# Patient Record
(demographics unavailable — no encounter records)

---

## 2025-05-07 NOTE — PHYSICAL EXAM
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

## 2025-05-07 NOTE — PLAN
[TextEntry] : Reviewed prior imaging findings and pathology and discussed results with patient. Reviewed benign nature of fibroadenoma and that they do not increase your risk of breast cancer however do increase your risk of growing more fibroadenomas.  Slide review will be performed to confirm the diagnosis, TACHO path.  She is to have 6-month follow-up B/L DX US to ensure stability of biopsied fibroadenoma and R breast mass in 9/2025.  If stable, she is to have a bilateral ultrasound April 2026 to confirm stability for 1 year follow-up.  She is to be reexamined by me same day.

## 2025-05-07 NOTE — FAMILY HISTORY
[TextEntry] : Denies fh of breast cancer, ovarian cancer, pancreatic cancer, prostate cancer, or melanoma. Detail Level: Detailed

## 2025-05-07 NOTE — HISTORY OF PRESENT ILLNESS
[FreeTextEntry1] : Patient is a 30 yo F who presents today for initial evaluation for breast cancer screening.  Patient originally felt a left breast mass on physical examination and was seen by her PCP and sent for imaging.  She was sent for B/ldxMG/US 3/31/25 showing a 0.9cm 12:00 mass on MG corresponding to 1.2cm 11:00 5FN mass on US.  She is s/p L US bx yielding FA 4/9/25. She was referred by PCP, Dr. Austin Ch. Denies family history of breast or ovarian cancer. Patient denies, skin changes, or nipple discharge bilaterally.   LIBRA Lifetime Risk-14% 2025  3/31/2025: B/L DX MG/US (L palpable/baseline): MG-heterogeneously dense.  L 0.9 cm 12:00 posterior mass corresponding to patient's area of palpable concern corresponding to L 1.2 cm 11:00 5 FN mass on US (rec L US BX).  US-R 1 cm 6:00 3 FN mass (rec 6-month R DX US).  No lymphadenopathy.  BI-RADS 4 4/9/2025: ([facility]) L US BX of 1.2 cm 11:00 5 FN mass (RIBBON): FA with mildly cellular stroma.  BENIGN and CONCORDANT.  Rec breast consultation.  Rec 6-month R DX US to ensure stability of 6:00 finding and left ultrasound for biopsy-proven fibroadenoma

## 2025-05-07 NOTE — PAST MEDICAL HISTORY
[Menstruating] : The patient is menstruating [Menarche Age ____] : age at menarche was [unfilled] [Definite ___ (Date)] : the last menstrual period was [unfilled] [Regular Cycle Intervals] : have been regular [Total Preg ___] : G[unfilled] [History of Hormone Replacement Treatment] : has no history of hormone replacement treatment [FreeTextEntry7] : yes for 6 months